# Patient Record
Sex: MALE
[De-identification: names, ages, dates, MRNs, and addresses within clinical notes are randomized per-mention and may not be internally consistent; named-entity substitution may affect disease eponyms.]

---

## 2021-02-02 ENCOUNTER — TRANSCRIPTION ENCOUNTER (OUTPATIENT)
Age: 36
End: 2021-02-02

## 2022-09-07 PROBLEM — Z00.00 ENCOUNTER FOR PREVENTIVE HEALTH EXAMINATION: Status: ACTIVE | Noted: 2022-09-07

## 2022-09-16 ENCOUNTER — APPOINTMENT (OUTPATIENT)
Dept: ORTHOPEDIC SURGERY | Facility: CLINIC | Age: 37
End: 2022-09-16

## 2022-09-16 VITALS
DIASTOLIC BLOOD PRESSURE: 75 MMHG | SYSTOLIC BLOOD PRESSURE: 110 MMHG | BODY MASS INDEX: 39.37 KG/M2 | HEART RATE: 78 BPM | WEIGHT: 245 LBS | TEMPERATURE: 97.9 F | HEIGHT: 66 IN

## 2022-09-16 DIAGNOSIS — M25.512 PAIN IN LEFT SHOULDER: ICD-10-CM

## 2022-09-16 PROCEDURE — 73030 X-RAY EXAM OF SHOULDER: CPT | Mod: LT

## 2022-09-16 PROCEDURE — 99203 OFFICE O/P NEW LOW 30 MIN: CPT

## 2022-09-16 RX ORDER — MELOXICAM 15 MG/1
15 TABLET ORAL
Qty: 21 | Refills: 0 | Status: ACTIVE | COMMUNITY
Start: 2022-09-16 | End: 1900-01-01

## 2022-09-16 NOTE — PHYSICAL EXAM
[de-identified] : General:\par Awake, alert, no acute distress, Patient was cooperative and appropriate during the examination.\par \par The patient is overweight for height and age.\par \par Walks without an antalgic gait.\par \par Full, painless range of motion of the neck and back.\par \par Exam of the bilateral lower extremities is intact and symmetric with regards to dermatologic, vascular, and neurologic exam. Bilateral lower extremity sensation is grossly intact to light touch in the DP/SP/T/S/S nerve distributions. Intact DF/PF/EHL. BIlateral lower extremities warm and well-perfused with brisk capillary refill.\par \par \par Pulmonary:\par Regular, nonlabored breathing\par \par Abdomen:\par Soft, nontender, nondistended.\par \par Lymphatic:\par No evidence of axillary lymphadenopathy\par \par Left shoulder Exam:\par Physical exam of the shoulder demonstrates normal skin without signs of skin changes or abnormalities. No erythema, warmth, or joint effusion appreciated. \par \par Sensation intact light touch C5-T1\par Palpable radial pulse\par Radial/ulnar/median/axillary/musculocutaneous/AIN/PIN nerves grossly intact\par \par Range of motion:\par Forward Flexion: 180\par Abduction: 150\par External Rotation: 60\par Internal Rotation: T7\par \par Palpation:\par Not tender to palpation over the glenohumeral joint\par Mildly tender palpation over the rotator cuff insertion on the greater tuberosity\par Mildly tender to palpation over the AC joint\par Not tender to palpation of the biceps tendon/bicipital groove\par \par Strength testing:\par Supraspinatus: 5/5\par Infraspinatus: 5/5\par Subscapularis: 5/5\par \par Special test:\par Empty can test mildly positive\par [place] impingement test mildly positive\par Speeds test negative\par [place]k's test negative\par Lift-off test negative\par Bell-press test negative\par Cross-arm adduction test negative\par  [de-identified] : X-rays including 4 views of the left shoulder were obtained in the office today on 9/16/2022 and reviewed the patient.  There is no acute fracture or dislocation.  There is no significant arthritis.  The patient has a type III acromion.

## 2022-09-16 NOTE — DISCUSSION/SUMMARY
[de-identified] : Assessment: 37-year-old male with left shoulder pain secondary to rotator cuff tendinitis and impingement\par \par Plan: I had a long discussion with the patient today regarding the nature of their diagnosis and treatment plan. We discussed the risks and benefits of no treatment as well as nonoperative and operative treatments.  I reviewed the patient's x-rays today with him in the office which are negative for any acute pathology.  He does have a type III acromion consistent with impingement.  He has good rotator cuff strength and range of motion on examination today.  Most of his pain is improved.  At this time I recommend conservative treatment of the patient's condition with modalities including rest, ice, heat, anti-inflammatory medications, activity modifications, and home stretching and strengthening exercises daily.  A prescription for meloxicam was sent to his pharmacy today to be taken as needed.  I discussed with the patient the risks and benefits associated with NSAID use.  He deferred any formal physical therapy at this time since he is feeling much better.  Recommend follow-up in 6 to 8 weeks as needed.  If he does continue to have pain he may call my office for a physical therapy referral.\par \par The patient verbalizes their understanding and agrees with the plan.  All questions were answered to their satisfaction.

## 2022-09-16 NOTE — HISTORY OF PRESENT ILLNESS
[de-identified] : 9/16/2022: Jeff is a pleasant 37-year-old right-hand-dominant male presents to the office today complaining of left shoulder pain.  The patient states that several weeks ago he woke up and had severe pain in the left shoulder.  He denies any injury or overuse that he can recall.  He believes he just slept on it funny.  The pain was quite severe and he was unable to use the arm like he normally can.  He was unable to reach overhead.  Over the past couple weeks it has improved on its own with symptomatic treatment.  He has been taking over-the-counter NSAIDs as needed.  He comes in today to have it checked out because he was concerned.  He has never had pain in the shoulder before.  The patient denies any fevers, chills, sweats, recent illnesses, numbness, tingling, weakness, or pain elsewhere at this time.

## 2025-08-26 ENCOUNTER — NON-APPOINTMENT (OUTPATIENT)
Age: 40
End: 2025-08-26